# Patient Record
Sex: MALE | Race: BLACK OR AFRICAN AMERICAN | NOT HISPANIC OR LATINO
[De-identification: names, ages, dates, MRNs, and addresses within clinical notes are randomized per-mention and may not be internally consistent; named-entity substitution may affect disease eponyms.]

---
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
## 2025-06-23 NOTE — ED PROVIDER NOTE - CARDIAC
Thank you for referring your patient to the [facility] program.  We are reaching out to make you aware that the patient has not responded to our attempts to schedule an appointment.  We have canceled the request in the referral work queue.  Please have the patient reach out to the clinic if they are interested.      Thank you,  [facility]  Office Number: [phone]    Regular rate and rhythm, Heart sounds S1 S2 present, no murmurs, rubs or gallops